# Patient Record
Sex: FEMALE | Race: WHITE | NOT HISPANIC OR LATINO | Employment: FULL TIME
[De-identification: names, ages, dates, MRNs, and addresses within clinical notes are randomized per-mention and may not be internally consistent; named-entity substitution may affect disease eponyms.]

---

## 2017-08-05 ENCOUNTER — HEALTH MAINTENANCE LETTER (OUTPATIENT)
Age: 52
End: 2017-08-05

## 2017-08-14 ENCOUNTER — HOSPITAL ENCOUNTER (OUTPATIENT)
Facility: CLINIC | Age: 52
Discharge: HOME | End: 2017-08-14
Attending: COLON & RECTAL SURGERY | Admitting: COLON & RECTAL SURGERY
Payer: COMMERCIAL

## 2017-08-14 VITALS
RESPIRATION RATE: 16 BRPM | DIASTOLIC BLOOD PRESSURE: 89 MMHG | WEIGHT: 175 LBS | SYSTOLIC BLOOD PRESSURE: 128 MMHG | HEIGHT: 66 IN | BODY MASS INDEX: 28.12 KG/M2 | OXYGEN SATURATION: 96 %

## 2017-08-14 LAB — COLONOSCOPY: NORMAL

## 2017-08-14 PROCEDURE — 88305 TISSUE EXAM BY PATHOLOGIST: CPT | Mod: 26 | Performed by: COLON & RECTAL SURGERY

## 2017-08-14 PROCEDURE — G0500 MOD SEDAT ENDO SERVICE >5YRS: HCPCS | Performed by: COLON & RECTAL SURGERY

## 2017-08-14 PROCEDURE — 88305 TISSUE EXAM BY PATHOLOGIST: CPT | Performed by: COLON & RECTAL SURGERY

## 2017-08-14 PROCEDURE — 99153 MOD SED SAME PHYS/QHP EA: CPT | Performed by: COLON & RECTAL SURGERY

## 2017-08-14 PROCEDURE — 45385 COLONOSCOPY W/LESION REMOVAL: CPT | Mod: PT | Performed by: COLON & RECTAL SURGERY

## 2017-08-14 PROCEDURE — 45382 COLONOSCOPY W/CONTROL BLEED: CPT | Performed by: COLON & RECTAL SURGERY

## 2017-08-14 PROCEDURE — 25000128 H RX IP 250 OP 636: Performed by: COLON & RECTAL SURGERY

## 2017-08-14 RX ORDER — ONDANSETRON 2 MG/ML
4 INJECTION INTRAMUSCULAR; INTRAVENOUS EVERY 6 HOURS PRN
Status: DISCONTINUED | OUTPATIENT
Start: 2017-08-14 | End: 2017-08-14 | Stop reason: HOSPADM

## 2017-08-14 RX ORDER — FENTANYL CITRATE 50 UG/ML
INJECTION, SOLUTION INTRAMUSCULAR; INTRAVENOUS PRN
Status: DISCONTINUED | OUTPATIENT
Start: 2017-08-14 | End: 2017-08-14 | Stop reason: HOSPADM

## 2017-08-14 RX ORDER — NALOXONE HYDROCHLORIDE 0.4 MG/ML
.1-.4 INJECTION, SOLUTION INTRAMUSCULAR; INTRAVENOUS; SUBCUTANEOUS
Status: DISCONTINUED | OUTPATIENT
Start: 2017-08-14 | End: 2017-08-14 | Stop reason: HOSPADM

## 2017-08-14 RX ORDER — FLUMAZENIL 0.1 MG/ML
0.2 INJECTION, SOLUTION INTRAVENOUS
Status: DISCONTINUED | OUTPATIENT
Start: 2017-08-14 | End: 2017-08-14 | Stop reason: HOSPADM

## 2017-08-14 RX ORDER — LIDOCAINE 40 MG/G
CREAM TOPICAL
Status: DISCONTINUED | OUTPATIENT
Start: 2017-08-14 | End: 2017-08-14 | Stop reason: HOSPADM

## 2017-08-14 RX ORDER — ONDANSETRON 4 MG/1
4 TABLET, ORALLY DISINTEGRATING ORAL EVERY 6 HOURS PRN
Status: DISCONTINUED | OUTPATIENT
Start: 2017-08-14 | End: 2017-08-14 | Stop reason: HOSPADM

## 2017-08-14 RX ORDER — ONDANSETRON 2 MG/ML
4 INJECTION INTRAMUSCULAR; INTRAVENOUS
Status: DISCONTINUED | OUTPATIENT
Start: 2017-08-14 | End: 2017-08-14 | Stop reason: HOSPADM

## 2017-08-14 NOTE — OP NOTE
See Provation Note In Chart    Taylor Palacios MD  [facility]  Office Phone # [phone]

## 2017-08-14 NOTE — IP AVS SNAPSHOT
MRN:[id number]                      After Visit Summary   8/14/2017    Kiera Manzo    MRN: [id number]           Thank you!     Thank you for choosing [facility] for your care. Our goal is always to provide you with excellent care. Hearing back from our patients is one way we can continue to improve our services. Please take a few minutes to complete the written survey that you may receive in the mail after you visit. If you would like to speak to someone directly about your visit please contact Patient Relations at [phone]. Thank you!          Patient Information     Date Of Birth          1965        About your hospital stay     You were admitted on:  August 14, 2017 You last received care in the:  [facility]    You were discharged on:  August 14, 2017       Who to Call     For medical emergencies, please call 911.  For non-urgent questions about your medical care, please call your primary care provider or clinic, None  For questions related to your surgery, please call your surgery clinic        Attending Provider     Provider Specialty    Taylor Palacios MD [facility]       Primary Care Provider    [facility]      Further instructions from your care team         Understanding Colon and Rectal Polyps     The colon has a smooth lining composed of millions of cells.     The colon (also called the large intestine) is a muscular tube that forms the last part of the digestive tract. It absorbs water and stores food waste. The colon is about 4 to 6 feet long. The rectum is the last 6 inches of the colon. The colon and rectum have a smooth lining composed of millions of cells. Changes in these cells can lead to growths in the colon that can become cancerous and should be removed.     When the Colon Lining Changes  Changes that occur in the cells that line the colon or rectum can lead to growths called polyps. Over a period of years, polyps  "can turn cancerous. Removing polyps early may prevent cancer from ever forming.      Polyps  Polyps are fleshy clumps of tissue that form on the lining of the colon or rectum. Small polyps are usually benign (not cancerous). However, over time, cells in a polyp can change and become cancerous. The larger a polyp grows, the more likely this is to happen. Also, certain types of polyps known as adenomatous polyps are considered premalignant. This means that they will almost always become cancerous if they re not removed.          Cancer  Almost all colorectal cancers start when polyp cells begin growing abnormally. As a cancerous tumor grows, it may involve more and more of the colon or rectum. In time, cancer can also grow beyond the colon or rectum and spread to nearby organs or to glands called lymph nodes. The cells can also travel to other parts of the body. This is known as metastasis. The earlier a cancerous tumor is removed, the better the chance of preventing its spread.        0323-2690 [place] [address]. All rights reserved. This information is not intended as a substitute for professional medical care. Always follow your healthcare professional's instructions.    Pending Results     No orders found from 8/12/2017 to 8/15/2017.            Admission Information     Date & Time Provider Department Dept. Phone    8/14/2017 Taylor Palacios MD [facility] [phone]      Your Vitals Were     Blood Pressure Respirations Height Weight Last Period Pulse Oximetry    117/88 16 1.676 m (5' 6\") 79.4 kg (175 lb) 04/11/2006 95%    BMI (Body Mass Index)                   28.25 kg/m2           [employer] Information     [employer] gives you secure access to your electronic health record. If you see a primary care provider, you can also send messages to your care team and make appointments. If you have questions, please call your primary care clinic.  If you do not have " a primary care provider, please call [phone] and they will assist you.        Care EveryWhere ID     This is your Care EveryWhere ID. This could be used by other organizations to access your [place] medical records  [id number]        Equal Access to Services     BASIM BROOKS : Hadii aad ku hadjavedjdoie Yossiali, waabdida luqadaha, qamaryta kalinhda neetacandiegabi, antonio perrinharshlibia cool. So [facility] [phone].    ATENCIÓN: Si habla español, tiene a adam disposición servicios gratuitos de asistencia lingüística. Llame al [phone].    We comply with applicable federal civil rights laws and [place] laws. We do not discriminate on the basis of race, color, national origin, age, disability sex, sexual orientation or gender identity.               Review of your medicines      CONTINUE these medicines which have NOT CHANGED        Dose / Directions    acetaminophen 500 MG tablet   Commonly known as:  TYLENOL   Used for:  Joint pain        Dose:  500-1000 mg   Take 1-2 tablets (500-1,000 mg) by mouth every 6 hours as needed for pain   Quantity:  30 tablet   Refills:  11       aspirin 81 MG tablet   Used for:  Joint pain        Dose:  81 mg   Take 1 tablet (81 mg) by mouth daily   Quantity:  30 tablet   Refills:  11       cetirizine 10 MG tablet   Commonly known as:  zyrTEC   Used for:  Seasonal allergies, Sinus congestion        Dose:  10 mg   Take 1 tablet (10 mg) by mouth every evening   Quantity:  30 tablet   Refills:  11       diphenhydrAMINE 25 MG tablet   Commonly known as:  BENADRYL   Used for:  Seasonal allergies        Dose:  25-50 mg   Take 1-2 tablets (25-50 mg) by mouth every 6 hours as needed for itching   Quantity:  60 tablet   Refills:  11       KLONOPIN PO        1 tab hs   Refills:  0       levothyroxine 150 MCG tablet   Commonly known as:  SYNTHROID/LEVOTHROID        Dose:  150 mcg   Take 150 mcg by mouth daily.   Refills:  0       naproxen sodium 550 MG tablet   Commonly known as:   ANAPROX   Used for:  Generalized osteoarthrosis, unspecified site        Dose:  550 mg   Take 1 tablet (550 mg) by mouth 2 times daily (with meals)   Quantity:  60 tablet   Refills:  1       pseudoePHEDrine 120 MG 12 hr tablet   Commonly known as:  SUDAFED   Used for:  Seasonal allergies, Sinus congestion        Dose:  120 mg   Take 1 tablet (120 mg) by mouth every 12 hours   Quantity:  60 tablet   Refills:  11       sertraline 100 MG tablet   Commonly known as:  ZOLOFT        Dose:  150 mg   Take 1.5 tablets (150 mg) by mouth daily   Quantity:  30 tablet   Refills:  1                Protect others around you: Learn how to safely use, store and throw away your medicines at www.disposemymeds.org.             Medication List: This is a list of all your medications and when to take them. Check marks below indicate your daily home schedule. Keep this list as a reference.      Medications           Morning Afternoon Evening Bedtime As Needed    acetaminophen 500 MG tablet   Commonly known as:  TYLENOL   Take 1-2 tablets (500-1,000 mg) by mouth every 6 hours as needed for pain                                aspirin 81 MG tablet   Take 1 tablet (81 mg) by mouth daily                                cetirizine 10 MG tablet   Commonly known as:  zyrTEC   Take 1 tablet (10 mg) by mouth every evening                                diphenhydrAMINE 25 MG tablet   Commonly known as:  BENADRYL   Take 1-2 tablets (25-50 mg) by mouth every 6 hours as needed for itching                                KLONOPIN PO   1 tab hs                                levothyroxine 150 MCG tablet   Commonly known as:  SYNTHROID/LEVOTHROID   Take 150 mcg by mouth daily.                                naproxen sodium 550 MG tablet   Commonly known as:  ANAPROX   Take 1 tablet (550 mg) by mouth 2 times daily (with meals)                                pseudoePHEDrine 120 MG 12 hr tablet   Commonly known as:  SUDAFED   Take 1 tablet (120 mg) by mouth  every 12 hours                                sertraline 100 MG tablet   Commonly known as:  ZOLOFT   Take 1.5 tablets (150 mg) by mouth daily

## 2017-08-14 NOTE — DISCHARGE INSTRUCTIONS
Understanding Colon and Rectal Polyps     The colon has a smooth lining composed of millions of cells.     The colon (also called the large intestine) is a muscular tube that forms the last part of the digestive tract. It absorbs water and stores food waste. The colon is about 4 to 6 feet long. The rectum is the last 6 inches of the colon. The colon and rectum have a smooth lining composed of millions of cells. Changes in these cells can lead to growths in the colon that can become cancerous and should be removed.     When the Colon Lining Changes  Changes that occur in the cells that line the colon or rectum can lead to growths called polyps. Over a period of years, polyps can turn cancerous. Removing polyps early may prevent cancer from ever forming.      Polyps  Polyps are fleshy clumps of tissue that form on the lining of the colon or rectum. Small polyps are usually benign (not cancerous). However, over time, cells in a polyp can change and become cancerous. The larger a polyp grows, the more likely this is to happen. Also, certain types of polyps known as adenomatous polyps are considered premalignant. This means that they will almost always become cancerous if they re not removed.          Cancer  Almost all colorectal cancers start when polyp cells begin growing abnormally. As a cancerous tumor grows, it may involve more and more of the colon or rectum. In time, cancer can also grow beyond the colon or rectum and spread to nearby organs or to glands called lymph nodes. The cells can also travel to other parts of the body. This is known as metastasis. The earlier a cancerous tumor is removed, the better the chance of preventing its spread.        6034-4435 [facility], [address] [place] [address], [place] [address]. All rights reserved. This information is not intended as a substitute for professional medical care. Always follow your healthcare professional's instructions.

## 2017-08-14 NOTE — H&P
Pre-Endoscopy History and Physical     Kiera Manzo MRN# [id number]   YOB: 1965 Age: 51 year old     Date of Procedure: 2017  Primary care provider: Sade Starks Md  Type of Endoscopy: colonoscopy  Reason for Procedure: screening  Type of Anesthesia Anticipated: Moderate Sedation    HPI:    Kiera is a 51 year old female who will be undergoing the above procedure.      A history and physical has been performed. The patient's medications and allergies have been reviewed. The risks and benefits of the procedure and the sedation options and risks were discussed with the patient.  All questions were answered and informed consent was obtained.      She denies a personal or family history of anesthesia complications or bleeding disorders.     Allergies   Allergen Reactions     Adhesive Tape Itching     Latex Other (See Comments)     Redness         Prior to Admission Medications   Prescriptions Last Dose Informant Patient Reported? Taking?   KLONOPIN OR 8/10/2017 at Unknown time  Yes Yes   Si tab hs   acetaminophen (TYLENOL) 500 MG tablet Unknown at Unknown time  No No   Sig: Take 1-2 tablets (500-1,000 mg) by mouth every 6 hours as needed for pain   aspirin 81 MG tablet Unknown at Unknown time  No No   Sig: Take 1 tablet (81 mg) by mouth daily   cetirizine (ZYRTEC) 10 MG tablet Unknown at Unknown time  No No   Sig: Take 1 tablet (10 mg) by mouth every evening   diphenhydrAMINE (BENADRYL) 25 MG tablet More than a month at Unknown time  No No   Sig: Take 1-2 tablets (25-50 mg) by mouth every 6 hours as needed for itching   levothyroxine (SYNTHROID, LEVOTHROID) 150 MCG tablet 2017 at Unknown time  Yes Yes   Sig: Take 150 mcg by mouth daily.   naproxen sodium (ANAPROX) 550 MG tablet 2017 at Unknown time  No Yes   Sig: Take 1 tablet (550 mg) by mouth 2 times daily (with meals)   pseudoePHEDrine (SUDAFED) 120 MG 12 hr tablet Past Week at Unknown time  No Yes   Sig: Take 1 tablet (120 mg) by  "mouth every 12 hours   sertraline (ZOLOFT) 100 MG tablet 8/13/2017 at Unknown time  Yes Yes   Sig: Take 1.5 tablets (150 mg) by mouth daily      Facility-Administered Medications: None       Patient Active Problem List   Diagnosis     Hypothyroidism     Generalized osteoarthrosis, unspecified site     Arthralgia of temporomandibular joint     Anxiety state     Hyperlipidemia LDL goal <160     Family hx-breast malignancy     Elevated glucose     Hypertriglyceridemia        Past Medical History:   Diagnosis Date     Absence of menstruation     using nuvaring     Arthralgia of temporomandibular joint      Depressive disorder, not elsewhere classified     with anxiety, tx with zoloft in past     NONSPECIFIC MEDICAL HISTORY 1999    vag del x1     NONSPECIFIC MEDICAL HISTORY 2004    fertility tx, unsuccessful     Unspecified hypothyroidism     managed by Dr Smith        History reviewed. No pertinent surgical history.    Social History   Substance Use Topics     Smoking status: Never Smoker     Smokeless tobacco: Never Used     Alcohol use Yes      Comment: occasionally-wine once a week       Family History   Problem Relation Age of Onset     Hypertension Mother      Breast Cancer Mother      early stage, remission     HEART DISEASE Mother      Hypertension Brother      Hypertension Brother        REVIEW OF SYSTEMS:     5 point ROS negative except as noted above in HPI, including Gen., Resp., CV, GI &  system review.      PHYSICAL EXAM:   LMP 04/11/2006 Estimated body mass index is 27.37 kg/(m^2) as calculated from the following:    Height as of 12/9/14: 1.664 m (5' 5.5\").    Weight as of 12/9/14: 75.8 kg (167 lb).   GENERAL APPEARANCE: healthy and alert  MENTAL STATUS: alert  AIRWAY EXAM: Mallampatti Class II (visualization of the soft palate, fauces, and uvula)  RESP: lungs clear to auscultation - no rales, rhonchi or wheezes  CV: regular rates and rhythm      DIAGNOSTICS:    Not indicated      IMPRESSION   ASA Class " 2 - Mild systemic disease        PLAN:       Plan for colonoscopy. We discussed the risks, benefits and alternatives and the patient wished to proceed.    The above has been forwarded to the consulting provider.      Signed Electronically by: Taylor Palacios MD  August 14, 2017

## 2017-08-15 LAB — COPATH REPORT: NORMAL

## 2018-09-23 ENCOUNTER — APPOINTMENT (OUTPATIENT)
Dept: GENERAL RADIOLOGY | Facility: CLINIC | Age: 53
End: 2018-09-23
Attending: EMERGENCY MEDICINE
Payer: COMMERCIAL

## 2018-09-23 ENCOUNTER — HOSPITAL ENCOUNTER (EMERGENCY)
Facility: CLINIC | Age: 53
Discharge: HOME | End: 2018-09-23
Attending: EMERGENCY MEDICINE | Admitting: EMERGENCY MEDICINE
Payer: COMMERCIAL

## 2018-09-23 VITALS
HEIGHT: 67 IN | DIASTOLIC BLOOD PRESSURE: 90 MMHG | OXYGEN SATURATION: 95 % | SYSTOLIC BLOOD PRESSURE: 115 MMHG | WEIGHT: 165 LBS | HEART RATE: 91 BPM | TEMPERATURE: 99.5 F | RESPIRATION RATE: 24 BRPM | BODY MASS INDEX: 25.9 KG/M2

## 2018-09-23 DIAGNOSIS — M94.0 COSTOCHONDRITIS: ICD-10-CM

## 2018-09-23 LAB
ANION GAP SERPL CALCULATED.3IONS-SCNC: 8 MMOL/L (ref 3–14)
BASOPHILS # BLD AUTO: 0 10E9/L (ref 0–0.2)
BASOPHILS NFR BLD AUTO: 0.4 %
BUN SERPL-MCNC: 10 MG/DL (ref 7–30)
CALCIUM SERPL-MCNC: 8.6 MG/DL (ref 8.5–10.1)
CHLORIDE SERPL-SCNC: 106 MMOL/L (ref 94–109)
CO2 SERPL-SCNC: 25 MMOL/L (ref 20–32)
CREAT SERPL-MCNC: 0.72 MG/DL (ref 0.52–1.04)
D DIMER PPP FEU-MCNC: 0.4 UG/ML FEU (ref 0–0.5)
DIFFERENTIAL METHOD BLD: NORMAL
EOSINOPHIL # BLD AUTO: 0.1 10E9/L (ref 0–0.7)
EOSINOPHIL NFR BLD AUTO: 0.6 %
ERYTHROCYTE [DISTWIDTH] IN BLOOD BY AUTOMATED COUNT: 11.8 % (ref 10–15)
GFR SERPL CREATININE-BSD FRML MDRD: 85 ML/MIN/1.7M2
GLUCOSE SERPL-MCNC: 115 MG/DL (ref 70–99)
HCT VFR BLD AUTO: 44.3 % (ref 35–47)
HGB BLD-MCNC: 14.7 G/DL (ref 11.7–15.7)
IMM GRANULOCYTES # BLD: 0 10E9/L (ref 0–0.4)
IMM GRANULOCYTES NFR BLD: 0.2 %
LYMPHOCYTES # BLD AUTO: 1.3 10E9/L (ref 0.8–5.3)
LYMPHOCYTES NFR BLD AUTO: 12.7 %
MCH RBC QN AUTO: 29.6 PG (ref 26.5–33)
MCHC RBC AUTO-ENTMCNC: 33.2 G/DL (ref 31.5–36.5)
MCV RBC AUTO: 89 FL (ref 78–100)
MONOCYTES # BLD AUTO: 0.8 10E9/L (ref 0–1.3)
MONOCYTES NFR BLD AUTO: 7.4 %
NEUTROPHILS # BLD AUTO: 8 10E9/L (ref 1.6–8.3)
NEUTROPHILS NFR BLD AUTO: 78.7 %
NRBC # BLD AUTO: 0 10*3/UL
NRBC BLD AUTO-RTO: 0 /100
PLATELET # BLD AUTO: 286 10E9/L (ref 150–450)
POTASSIUM SERPL-SCNC: 3.8 MMOL/L (ref 3.4–5.3)
RBC # BLD AUTO: 4.96 10E12/L (ref 3.8–5.2)
SODIUM SERPL-SCNC: 139 MMOL/L (ref 133–144)
TROPONIN I SERPL-MCNC: <0.015 UG/L (ref 0–0.04)
WBC # BLD AUTO: 10.2 10E9/L (ref 4–11)

## 2018-09-23 PROCEDURE — 71046 X-RAY EXAM CHEST 2 VIEWS: CPT

## 2018-09-23 PROCEDURE — 93005 ELECTROCARDIOGRAM TRACING: CPT

## 2018-09-23 PROCEDURE — 96374 THER/PROPH/DIAG INJ IV PUSH: CPT

## 2018-09-23 PROCEDURE — 99285 EMERGENCY DEPT VISIT HI MDM: CPT | Mod: 25

## 2018-09-23 PROCEDURE — 80048 BASIC METABOLIC PNL TOTAL CA: CPT | Performed by: EMERGENCY MEDICINE

## 2018-09-23 PROCEDURE — 25000128 H RX IP 250 OP 636: Performed by: EMERGENCY MEDICINE

## 2018-09-23 PROCEDURE — 84484 ASSAY OF TROPONIN QUANT: CPT | Performed by: EMERGENCY MEDICINE

## 2018-09-23 PROCEDURE — 85025 COMPLETE CBC W/AUTO DIFF WBC: CPT | Performed by: EMERGENCY MEDICINE

## 2018-09-23 PROCEDURE — 85379 FIBRIN DEGRADATION QUANT: CPT | Performed by: EMERGENCY MEDICINE

## 2018-09-23 RX ORDER — KETOROLAC TROMETHAMINE 15 MG/ML
15 INJECTION, SOLUTION INTRAMUSCULAR; INTRAVENOUS ONCE
Status: COMPLETED | OUTPATIENT
Start: 2018-09-23 | End: 2018-09-23

## 2018-09-23 RX ORDER — NAPROXEN 500 MG/1
500 TABLET ORAL 2 TIMES DAILY WITH MEALS
Qty: 28 TABLET | Refills: 0 | Status: SHIPPED | OUTPATIENT
Start: 2018-09-23 | End: 2018-10-07

## 2018-09-23 RX ADMIN — KETOROLAC TROMETHAMINE 15 MG: 15 INJECTION, SOLUTION INTRAMUSCULAR; INTRAVENOUS at 16:07

## 2018-09-23 ASSESSMENT — ENCOUNTER SYMPTOMS
SHORTNESS OF BREATH: 1
CHEST TIGHTNESS: 1
NAUSEA: 0

## 2018-09-23 NOTE — ED AVS SNAPSHOT
[facility]    [address]    [facility] [address]    Phone:  [phone]    Fax:  [phone]                                       Kiera Manzo   MRN: [id number]    Department:  [facility]   Date of Visit:  9/23/2018           Patient Information     Date Of Birth          1965        Your diagnoses for this visit were:     Costochondritis        You were seen by Sameer Bar MD.      Follow-up Information     Follow up with primary care provider.    Why:  Wed-Fri of this week, sooner if pain is worsening         Follow up with [facility].    Specialty:  EMERGENCY MEDICINE    Why:  As needed, If symptoms worsen    Contact information:    [address]  [facility] [id number] [phone]        Discharge Instructions         Chest Wall Pain: Costochondritis    The chest pain that you have had today is caused by costochondritis. This condition is caused by an inflammation of the cartilage joining your ribs to your breastbone. It is not caused by heart or lung problems. Your healthcare team has made sure that the chest pain you feel is not from a life threatening cause of chest pain such as heart attack, collapsed lung, blood clot in the lung, tear in the aorta, or esophageal rupture. The inflammation may have been brought on by a blow to the chest, lifting heavy objects, intense exercise, or an illness that made you cough and sneeze a lot. It often occurs during times of emotional stress. It can be painful, but it is not dangerous. It usually goes away in 1 to 2 weeks. But it may happen again. Rarely, a more serious condition may cause symptoms similar to costochondritis. That s why it s important to watch for the warning signs listed below.  Home care  Follow these guidelines when caring for yourself at home:    If you feel that emotional stress is a cause of your  condition, try to figure out the sources of that stress. It may not be obvious. Learn ways to deal with the stress in your life. This can include regular exercise, muscle relaxation, meditation, or simply taking time out for yourself.    You may use acetaminophen, ibuprofen, or naproxen to control pain, unless another pain medicine was prescribed. If you have liver or kidney disease or ever had a stomach ulcer, talk with your healthcare provider before using these medicines.    You can also help ease pain by using a hot, wet compress or heating pad. Use this with or without a medicated skin cream that helps relieves pain.    Do stretching exercise as advised by your provider.    Take any prescribed medicines as directed.  Follow-up care  Follow up with your healthcare provider, or as advised, if you do not start to get better in the next 2 days.  When to seek medical advice  Call your healthcare provider right away if any of these occur:    A change in the type of pain. Call if it feels different, becomes more serious, lasts longer, or spreads into your shoulder, arm, neck, jaw, or back.    Shortness of breath or pain gets worse when you breathe    Weakness, dizziness, or fainting    Cough with dark-colored sputum (phlegm) or blood    Abdominal pain    Dark red or black stools    Fever of 100.4 F (38 C) or higher, or as directed by your healthcare provider  Date Last Reviewed: 12/1/2016 2000-2017 [employer]. [address] [place] [address]. All rights reserved. This information is not intended as a substitute for professional medical care. Always follow your healthcare professional's instructions.          24 Hour Appointment Hotline       To make an appointment at any [facility], call [phone] ([phone]). If you don't have a family doctor or clinic, we will help you find one. [place] clinics are conveniently located to serve the needs of you and your family.              Review of your medicines      START taking        Dose / Directions Last dose taken    naproxen 500 MG tablet   Commonly known as:  NAPROSYN   Dose:  500 mg   Quantity:  28 tablet        Take 1 tablet (500 mg) by mouth 2 times daily (with meals) for 14 days May discontinue when pain improves.   Refills:  0          Our records show that you are taking the medicines listed below. If these are incorrect, please call your family doctor or clinic.        Dose / Directions Last dose taken    acetaminophen 500 MG tablet   Commonly known as:  TYLENOL   Dose:  500-1000 mg   Quantity:  30 tablet        Take 1-2 tablets (500-1,000 mg) by mouth every 6 hours as needed for pain   Refills:  11        aspirin 81 MG tablet   Dose:  81 mg   Quantity:  30 tablet        Take 1 tablet (81 mg) by mouth daily   Refills:  11        cetirizine 10 MG tablet   Commonly known as:  zyrTEC   Dose:  10 mg   Quantity:  30 tablet        Take 1 tablet (10 mg) by mouth every evening   Refills:  11        diphenhydrAMINE 25 MG tablet   Commonly known as:  BENADRYL   Dose:  25-50 mg   Quantity:  60 tablet        Take 1-2 tablets (25-50 mg) by mouth every 6 hours as needed for itching   Refills:  11        KLONOPIN PO        1 tab hs   Refills:  0        levothyroxine 150 MCG tablet   Commonly known as:  SYNTHROID/LEVOTHROID   Dose:  150 mcg        Take 150 mcg by mouth daily.   Refills:  0        pseudoePHEDrine 120 MG 12 hr tablet   Commonly known as:  SUDAFED   Dose:  120 mg   Quantity:  60 tablet        Take 1 tablet (120 mg) by mouth every 12 hours   Refills:  11        sertraline 100 MG tablet   Commonly known as:  ZOLOFT   Dose:  150 mg   Quantity:  30 tablet        Take 1.5 tablets (150 mg) by mouth daily   Refills:  1          STOP taking        Dose Reason for stopping Comments    naproxen sodium 550 MG tablet   Commonly known as:  ANAPROX                      Prescriptions were sent or printed at these locations (1 Prescription)                    Other Prescriptions                Printed at Department/Unit printer (1 of 1)         naproxen (NAPROSYN) 500 MG tablet                Procedures and tests performed during your visit     Basic metabolic panel (BMP)    CBC + differential    Chest XR,  PA & LAT    D dimer quantitative    EKG 12 lead    Troponin I (now)      Orders Needing Specimen Collection     None      Pending Results     Date and Time Order Name Status Description    9/23/2018 1510 EKG 12 lead Preliminary             Pending Culture Results     No orders found from 9/21/2018 to 9/24/2018.            Pending Results Instructions     If you had any lab results that were not finalized at the time of your Discharge, you can call the ED Lab Result RN at [phone]. You will be contacted by this team for any positive Lab results or changes in treatment. The nurses are available 7 days a week from 10A to 6:30P.  You can leave a message 24 hours per day and they will return your call.        Test Results From Your Hospital Stay        9/23/2018  3:33 PM      Component Results     Component Value Ref Range & Units Status    WBC 10.2 4.0 - 11.0 10e9/L Final    RBC Count 4.96 3.8 - 5.2 10e12/L Final    Hemoglobin 14.7 11.7 - 15.7 g/dL Final    Hematocrit 44.3 35.0 - 47.0 % Final    MCV 89 78 - 100 fl Final    MCH 29.6 26.5 - 33.0 pg Final    MCHC 33.2 31.5 - 36.5 g/dL Final    RDW 11.8 10.0 - 15.0 % Final    Platelet Count 286 150 - 450 10e9/L Final    Diff Method Automated Method  Final    % Neutrophils 78.7 % Final    % Lymphocytes 12.7 % Final    % Monocytes 7.4 % Final    % Eosinophils 0.6 % Final    % Basophils 0.4 % Final    % Immature Granulocytes 0.2 % Final    Nucleated RBCs 0 0 /100 Final    Absolute Neutrophil 8.0 1.6 - 8.3 10e9/L Final    Absolute Lymphocytes 1.3 0.8 - 5.3 10e9/L Final    Absolute Monocytes 0.8 0.0 - 1.3 10e9/L Final    Absolute Eosinophils 0.1 0.0 - 0.7 10e9/L Final    Absolute Basophils 0.0 0.0 - 0.2 10e9/L Final     Abs Immature Granulocytes 0.0 0 - 0.4 10e9/L Final    Absolute Nucleated RBC 0.0  Final         9/23/2018  3:51 PM      Component Results     Component Value Ref Range & Units Status    Sodium 139 133 - 144 mmol/L Final    Potassium 3.8 3.4 - 5.3 mmol/L Final    Chloride 106 94 - 109 mmol/L Final    Carbon Dioxide 25 20 - 32 mmol/L Final    Anion Gap 8 3 - 14 mmol/L Final    Glucose 115 (H) 70 - 99 mg/dL Final    Urea Nitrogen 10 7 - 30 mg/dL Final    Creatinine 0.72 0.52 - 1.04 mg/dL Final    GFR Estimate 85 >60 mL/min/1.7m2 Final    Non  GFR Calc    GFR Estimate If Black >90 >60 mL/min/1.7m2 Final    African American GFR Calc    Calcium 8.6 8.5 - 10.1 mg/dL Final         9/23/2018  3:51 PM      Component Results     Component Value Ref Range & Units Status    Troponin I ES <0.015 0.000 - 0.045 ug/L Final    The 99th percentile for upper reference range is 0.045 ug/L.  Troponin values   in the range of 0.045 - 0.120 ug/L may be associated with risks of adverse   clinical events.           9/23/2018  4:14 PM      Component Results     Component Value Ref Range & Units Status    D Dimer 0.4 0.0 - 0.50 ug/ml FEU Final    This D-dimer assay is intended for use in conjunction with a clinical pretest   probability assessment model to exclude pulmonary embolism (PE) and deep   venous thrombosis (DVT) in outpatients suspected of PE or DVT. The cut-off   value is 0.5 ug/mL FEU.           9/23/2018  4:57 PM      Narrative     CHEST TWO VIEW   9/23/2018 4:42 PM     HISTORY: Pleuritic chest pain.     COMPARISON: None.     FINDINGS: Cardiomediastinal silhouette within normal limits. No focal  airspace opacities. Probable calcified granuloma in the left lower  lobe. No pleural effusion. No pneumothorax identified. The bones are  unremarkable.         Impression     IMPRESSION: No acute cardiopulmonary abnormalities.    IRINA SARAH MD                Clinical Quality Measure: Blood Pressure Screening     Your  blood pressure was checked while you were in the emergency department today. The last reading we obtained was  BP: (!) 136/95 . Please read the guidelines below about what these numbers mean and what you should do about them.  If your systolic blood pressure (the top number) is less than 120 and your diastolic blood pressure (the bottom number) is less than 80, then your blood pressure is normal. There is nothing more that you need to do about it.  If your systolic blood pressure (the top number) is 120-139 or your diastolic blood pressure (the bottom number) is 80-89, your blood pressure may be higher than it should be. You should have your blood pressure rechecked within a year by a primary care provider.  If your systolic blood pressure (the top number) is 140 or greater or your diastolic blood pressure (the bottom number) is 90 or greater, you may have high blood pressure. High blood pressure is treatable, but if left untreated over time it can put you at risk for heart attack, stroke, or kidney failure. You should have your blood pressure rechecked by a primary care provider within the next 4 weeks.  If your provider in the emergency department today gave you specific instructions to follow-up with your doctor or provider even sooner than that, you should follow that instruction and not wait for up to 4 weeks for your follow-up visit.        Thank you for choosing [facility]       Thank you for choosing [facility] for your care. Our goal is always to provide you with excellent care. Hearing back from our patients is one way we can continue to improve our services. Please take a few minutes to complete the written survey that you may receive in the mail after you visit with us. Thank you!        [facility] Information     [employer] gives you secure access to your electronic health record. If you see a primary care provider, you can also send messages to your care team and make appointments. If you have questions, please  call your primary care clinic.  If you do not have a primary care provider, please call [phone] and they will assist you.        Care EveryWhere ID     This is your Care EveryWhere ID. This could be used by other organizations to access your [facility] medical records  [id number]        Equal Access to Services     BASIM BROOKS : Harsh Turner, yuliana garcia, kvng santiagoalantonio reyna. So [facility] [phone].    ATENCIÓN: Si habla español, tiene a adam disposición servicios gratuitos de asistencia lingüística. Llame al [phone].    We comply with applicable federal civil rights laws and [place] laws. We do not discriminate on the basis of race, color, national origin, age, disability, sex, sexual orientation, or gender identity.            After Visit Summary       This is your record. Keep this with you and show to your community pharmacist(s) and doctor(s) at your next visit.

## 2018-09-23 NOTE — ED TRIAGE NOTES
Patient complaining of chest pressure since waking this morning.  Also having pain with deep inspiration.  Had a flu shot two days ago.      ABCs intact.  Alert and oriented x 3.

## 2018-09-23 NOTE — ED AVS SNAPSHOT
[facility]    [address]    [facility] [address]    Phone:  [phone]    Fax:  [phone]                                       Kiera Manzo   MRN: [id number]    Department:  [facility]   Date of Visit:  9/23/2018           After Visit Summary Signature Page     I have received my discharge instructions, and my questions have been answered. I have discussed any challenges I see with this plan with the nurse or doctor.    ..........................................................................................................................................  Patient/Patient Representative Signature      ..........................................................................................................................................  Patient Representative Print Name and Relationship to Patient    ..................................................               ................................................  Date                                   Time    ..........................................................................................................................................  Reviewed by Signature/Title    ...................................................              ..............................................  Date                                               Time          22EPIC Rev 08/18

## 2018-09-23 NOTE — ED PROVIDER NOTES
"  History     Chief Complaint:  Chest Pain and Shortness of Breath    The history is provided by the patient.      Kiera Manzo is a 53 year old female with a history of hypothyroidism, hyperlipidemia, HTN, TMJ, and anxiety  who presents to the emergency department with her  Evin for evaluation of chest pain and shortness of breath. This morning at 0600, the patient woke up with chest pain under her sternum down to her breasts. She feels chest pressure, described as a squeezing pain, as well as feeling like she wants to cough but is unable to and pain with inspiration. She states her pain feels like she has been \"kicked\" in the chest. This chest pain and pain with breathing prompted the patient to seek evaluation here in the emergency department.     Here, the patient presents with anxiety but has since calmed down. She denies the chest pain radiating into her arm, neck, and jaw. She denies nausea. She reports her arm does hurt secondary to just recently receiving the flu shot. Her arm feels like sore muscles. She does report a history of GERD but denies the feeling of the \"burning liquid sensation\" that her history of GERD has given her. She reports having spaghetti without spicy sausage for dinner last night and then raisin bran and peanuts before bed.   She denies history of breathing issues. She had 2 Naproxen in the \"early afternoon\". She also took a benadryl     Allergies:  Adhesive tape  Latex     Medications:    Aspirin  Zyrtec  Klonopin  Benadryl  Zoloft  Sudafed  Anaprox  Levothyroxine  Zoloft    Past Medical History:    Absence of menstruation  TMJ  Depression  Hypertriglyceridemia  Hyperlipidemia  Anxiety  Hypothyroidism   Generalized osteoarthrosis  HTN    Past Surgical History:    The patient does not have any pertinent past surgical history.    Family History:    HTN  Breast cancer  Heart disease    Social History:  Negative for tobacco use.  Positive for alcohol use  Marital Status:    " "    Review of Systems   Respiratory: Positive for chest tightness and shortness of breath.    Cardiovascular: Positive for chest pain.   Gastrointestinal: Negative for nausea.   All other systems reviewed and are negative.    Physical Exam     Patient Vitals for the past 24 hrs:   BP Temp Temp src Pulse Heart Rate Resp SpO2 Height Weight   09/23/18 1645 (!) 136/95 - - - 75 - 97 % - -   09/23/18 1630 (!) 130/91 - - - 82 - 94 % - -   09/23/18 1615 (!) 130/96 - - - 81 - 92 % - -   09/23/18 1600 (!) 141/92 - - - - - - - -   09/23/18 1545 (!) 144/98 - - - 83 - 94 % - -   09/23/18 1530 (!) 136/96 - - - 87 - 92 % - -   09/23/18 1505 (!) 151/116 99.5  F (37.5  C) Oral 91 - 24 94 % 1.702 m (5' 7\") 74.8 kg (165 lb)       Physical Exam  Nursing note and vitals reviewed.  Constitutional: Cooperative.   HENT:   Mouth/Throat: Moist mucous membranes.   Eyes: EOMI, nonicteric sclera  Cardiovascular: Normal rate, regular rhythm, no murmurs, rubs, or gallops  Pulmonary/Chest: Effort normal and breath sounds normal. No respiratory distress. No wheezes. No rales. Pain to palpation anterior chest.   Abdominal: Soft. Nontender, nondistended, no guarding or rigidity. BS present.   Musculoskeletal: Normal range of motion.   Neurological: Alert. Moves all extremities spontaneously.   Skin: Skin is warm and dry. No rash noted.   Psychiatric: Normal mood and affect.       Emergency Department Course   ECG:  Indication: chest pain, shortness of breath   Time: 1503  Vent. Rate 92 bpm. AZ interval 174. QRS duration 72. QT/QTc 362/447. P-R-T axis 50 19 56. Normal sinus rhythm. Normal ECG. Read time: 1506.     Imaging:  Radiographic findings were communicated with the patient who voiced understanding of the findings.    XR Chest PA & LAT:   No acute cardiopulmonary abnormalities. As per radiology.     Laboratory:  CBC: WBC: 10.2, HGB: 14.7, PLT: 286  1522 Troponin: <0.015  BMP: Glucose 115 (H), o/w WNL (Creatinine: 0.72)  D-Dimer: " 0.4    Interventions:  1607 Toradol 15 mg IV    Emergency Department Course:  1534 Nursing notes and vitals reviewed.  I performed an exam of the patient as documented above.     IV inserted. Medicine administered as documented above. Blood drawn. This was sent to the lab for further testing, results above.    EKG obtained in the ED, see results above.     The patient was sent for a chest xray while in the emergency department, findings above.     1727 I rechecked the patient and discussed the results of her workup thus far.     Findings and plan explained to the Patient. Patient discharged home with instructions regarding supportive care, medications, and reasons to return. The importance of close follow-up was reviewed. The patient was prescribed Naproxen.     I personally reviewed the laboratory results with the Patient and spouse and answered all related questions prior to discharge.   Impression & Plan    Medical Decision Making:  Kiera Manzo is a 53 year old female presents with complaint of chest wall/rib pain.  There has been no trauma.  Pain is exactly reproducible with palpation.  CXR was obtained and showed no acute process. This does not appear to be ACS or PE as symptomatology is different and d-dimer/troponin are negative. She has had some improvement with treatment here.  She is advised to take Naproxen and use ice or heat to relieve pain.  She will follow up with PCP in 3-5 days if no improvement or sooner if worsening.  she will return to the ED if she develops difficulty breathing, fever, worsening chest pain or for other concerns.    Diagnosis:    ICD-10-CM    1. Costochondritis M94.0        Disposition:  discharged to home with her     Discharge Medications:  New Prescriptions    NAPROXEN (NAPROSYN) 500 MG TABLET    Take 1 tablet (500 mg) by mouth 2 times daily (with meals) for 14 days May discontinue when pain improves.     Scribe Disclosure:   I, Soco Olivera, am serving as a scribe on  9/23/2018 at 3:34 PM to personally document services performed by Sameer Bar MD based on my observations and the provider's statements to me.     Soco Olivera  9/23/2018   [facility] EMERGENCY DEPARTMENT       Sameer Bar MD  09/25/18 0703

## 2018-09-23 NOTE — DISCHARGE INSTRUCTIONS
Chest Wall Pain: Costochondritis    The chest pain that you have had today is caused by costochondritis. This condition is caused by an inflammation of the cartilage joining your ribs to your breastbone. It is not caused by heart or lung problems. Your healthcare team has made sure that the chest pain you feel is not from a life threatening cause of chest pain such as heart attack, collapsed lung, blood clot in the lung, tear in the aorta, or esophageal rupture. The inflammation may have been brought on by a blow to the chest, lifting heavy objects, intense exercise, or an illness that made you cough and sneeze a lot. It often occurs during times of emotional stress. It can be painful, but it is not dangerous. It usually goes away in 1 to 2 weeks. But it may happen again. Rarely, a more serious condition may cause symptoms similar to costochondritis. That s why it s important to watch for the warning signs listed below.  Home care  Follow these guidelines when caring for yourself at home:    If you feel that emotional stress is a cause of your condition, try to figure out the sources of that stress. It may not be obvious. Learn ways to deal with the stress in your life. This can include regular exercise, muscle relaxation, meditation, or simply taking time out for yourself.    You may use acetaminophen, ibuprofen, or naproxen to control pain, unless another pain medicine was prescribed. If you have liver or kidney disease or ever had a stomach ulcer, talk with your healthcare provider before using these medicines.    You can also help ease pain by using a hot, wet compress or heating pad. Use this with or without a medicated skin cream that helps relieves pain.    Do stretching exercise as advised by your provider.    Take any prescribed medicines as directed.  Follow-up care  Follow up with your healthcare provider, or as advised, if you do not start to get better in the next 2 days.  When to seek medical  advice  Call your healthcare provider right away if any of these occur:    A change in the type of pain. Call if it feels different, becomes more serious, lasts longer, or spreads into your shoulder, arm, neck, jaw, or back.    Shortness of breath or pain gets worse when you breathe    Weakness, dizziness, or fainting    Cough with dark-colored sputum (phlegm) or blood    Abdominal pain    Dark red or black stools    Fever of 100.4 F (38 C) or higher, or as directed by your healthcare provider  Date Last Reviewed: 12/1/2016 2000-2017 [employer]. [address] [place] [address]. All rights reserved. This information is not intended as a substitute for professional medical care. Always follow your healthcare professional's instructions.

## 2018-09-24 LAB — INTERPRETATION ECG - MUSE: NORMAL

## 2019-10-03 ENCOUNTER — HEALTH MAINTENANCE LETTER (OUTPATIENT)
Age: 54
End: 2019-10-03

## 2020-11-07 ENCOUNTER — HEALTH MAINTENANCE LETTER (OUTPATIENT)
Age: 55
End: 2020-11-07

## 2021-09-05 ENCOUNTER — HEALTH MAINTENANCE LETTER (OUTPATIENT)
Age: 56
End: 2021-09-05

## 2021-12-26 ENCOUNTER — HEALTH MAINTENANCE LETTER (OUTPATIENT)
Age: 56
End: 2021-12-26

## 2023-08-03 ENCOUNTER — TRANSCRIBE ORDERS (OUTPATIENT)
Dept: GASTROENTEROLOGY | Facility: CLINIC | Age: 58
End: 2023-08-03
Payer: COMMERCIAL

## 2023-08-03 ENCOUNTER — TELEPHONE (OUTPATIENT)
Dept: GASTROENTEROLOGY | Facility: CLINIC | Age: 58
End: 2023-08-03
Payer: COMMERCIAL

## 2023-08-03 DIAGNOSIS — Z86.0100 PERSONAL HISTORY OF COLONIC POLYPS: Primary | ICD-10-CM

## 2023-08-03 NOTE — TELEPHONE ENCOUNTER
Procedure Scheduled: Lower Endoscopy [Colonoscopy]  Procedure Date: 8/29/2023  Site:[facility]; [address] [place] [address]  Endoscopist: ZEESHAN  Ordering Provider: ZEESHAN  Scheduled by (per the direction of): FAX FROM VALERIE ROBB           REMINDER: We do not accept [employer] insurance.

## 2023-08-24 RX ORDER — HYDROXYZINE HYDROCHLORIDE 25 MG/1
25 TABLET, FILM COATED ORAL AT BEDTIME
COMMUNITY

## 2023-08-29 ENCOUNTER — HOSPITAL ENCOUNTER (OUTPATIENT)
Facility: CLINIC | Age: 58
Discharge: HOME | End: 2023-08-29
Attending: COLON & RECTAL SURGERY | Admitting: COLON & RECTAL SURGERY
Payer: COMMERCIAL

## 2023-08-29 VITALS
RESPIRATION RATE: 16 BRPM | SYSTOLIC BLOOD PRESSURE: 125 MMHG | HEIGHT: 66 IN | OXYGEN SATURATION: 96 % | BODY MASS INDEX: 26.52 KG/M2 | WEIGHT: 165 LBS | DIASTOLIC BLOOD PRESSURE: 92 MMHG | HEART RATE: 68 BPM

## 2023-08-29 LAB — COLONOSCOPY: NORMAL

## 2023-08-29 PROCEDURE — G0121 COLON CA SCRN NOT HI RSK IND: HCPCS | Performed by: COLON & RECTAL SURGERY

## 2023-08-29 PROCEDURE — 99153 MOD SED SAME PHYS/QHP EA: CPT | Performed by: COLON & RECTAL SURGERY

## 2023-08-29 PROCEDURE — 250N000013 HC RX MED GY IP 250 OP 250 PS 637: Performed by: COLON & RECTAL SURGERY

## 2023-08-29 PROCEDURE — G0500 MOD SEDAT ENDO SERVICE >5YRS: HCPCS | Performed by: COLON & RECTAL SURGERY

## 2023-08-29 PROCEDURE — 45378 DIAGNOSTIC COLONOSCOPY: CPT | Performed by: COLON & RECTAL SURGERY

## 2023-08-29 PROCEDURE — 250N000011 HC RX IP 250 OP 636: Performed by: COLON & RECTAL SURGERY

## 2023-08-29 RX ORDER — FENTANYL CITRATE 50 UG/ML
50-100 INJECTION, SOLUTION INTRAMUSCULAR; INTRAVENOUS EVERY 5 MIN PRN
Status: DISCONTINUED | OUTPATIENT
Start: 2023-08-29 | End: 2023-08-29 | Stop reason: HOSPADM

## 2023-08-29 RX ORDER — DIPHENHYDRAMINE HYDROCHLORIDE 50 MG/ML
25-50 INJECTION INTRAMUSCULAR; INTRAVENOUS
Status: DISCONTINUED | OUTPATIENT
Start: 2023-08-29 | End: 2023-08-29 | Stop reason: HOSPADM

## 2023-08-29 RX ORDER — EPINEPHRINE 1 MG/ML
0.1 INJECTION, SOLUTION INTRAMUSCULAR; SUBCUTANEOUS
Status: DISCONTINUED | OUTPATIENT
Start: 2023-08-29 | End: 2023-08-29 | Stop reason: HOSPADM

## 2023-08-29 RX ORDER — LIDOCAINE 40 MG/G
CREAM TOPICAL
Status: DISCONTINUED | OUTPATIENT
Start: 2023-08-29 | End: 2023-08-29 | Stop reason: HOSPADM

## 2023-08-29 RX ORDER — FLUMAZENIL 0.1 MG/ML
0.2 INJECTION, SOLUTION INTRAVENOUS
Status: DISCONTINUED | OUTPATIENT
Start: 2023-08-29 | End: 2023-08-29 | Stop reason: HOSPADM

## 2023-08-29 RX ORDER — NALOXONE HYDROCHLORIDE 0.4 MG/ML
0.2 INJECTION, SOLUTION INTRAMUSCULAR; INTRAVENOUS; SUBCUTANEOUS
Status: DISCONTINUED | OUTPATIENT
Start: 2023-08-29 | End: 2023-08-29 | Stop reason: HOSPADM

## 2023-08-29 RX ORDER — ATROPINE SULFATE 0.1 MG/ML
1 INJECTION INTRAVENOUS
Status: DISCONTINUED | OUTPATIENT
Start: 2023-08-29 | End: 2023-08-29 | Stop reason: HOSPADM

## 2023-08-29 RX ORDER — ONDANSETRON 2 MG/ML
4 INJECTION INTRAMUSCULAR; INTRAVENOUS EVERY 6 HOURS PRN
Status: DISCONTINUED | OUTPATIENT
Start: 2023-08-29 | End: 2023-08-29 | Stop reason: HOSPADM

## 2023-08-29 RX ORDER — ONDANSETRON 4 MG/1
4 TABLET, ORALLY DISINTEGRATING ORAL EVERY 6 HOURS PRN
Status: DISCONTINUED | OUTPATIENT
Start: 2023-08-29 | End: 2023-08-29 | Stop reason: HOSPADM

## 2023-08-29 RX ORDER — NALOXONE HYDROCHLORIDE 0.4 MG/ML
0.4 INJECTION, SOLUTION INTRAMUSCULAR; INTRAVENOUS; SUBCUTANEOUS
Status: DISCONTINUED | OUTPATIENT
Start: 2023-08-29 | End: 2023-08-29 | Stop reason: HOSPADM

## 2023-08-29 RX ORDER — PROCHLORPERAZINE MALEATE 10 MG
10 TABLET ORAL EVERY 6 HOURS PRN
Status: DISCONTINUED | OUTPATIENT
Start: 2023-08-29 | End: 2023-08-29 | Stop reason: HOSPADM

## 2023-08-29 RX ORDER — ONDANSETRON 2 MG/ML
4 INJECTION INTRAMUSCULAR; INTRAVENOUS
Status: DISCONTINUED | OUTPATIENT
Start: 2023-08-29 | End: 2023-08-29 | Stop reason: HOSPADM

## 2023-08-29 RX ORDER — SIMETHICONE 40MG/0.6ML
133 SUSPENSION, DROPS(FINAL DOSAGE FORM)(ML) ORAL
Status: COMPLETED | OUTPATIENT
Start: 2023-08-29 | End: 2023-08-29

## 2023-08-29 RX ADMIN — MIDAZOLAM 2 MG: 1 INJECTION INTRAMUSCULAR; INTRAVENOUS at 12:01

## 2023-08-29 RX ADMIN — SIMETHICONE 30 MG: 20 EMULSION ORAL at 12:11

## 2023-08-29 RX ADMIN — FENTANYL CITRATE 100 MCG: 50 INJECTION, SOLUTION INTRAMUSCULAR; INTRAVENOUS at 12:01

## 2023-08-29 ASSESSMENT — ACTIVITIES OF DAILY LIVING (ADL)
ADLS_ACUITY_SCORE: 35
ADLS_ACUITY_SCORE: 35

## 2023-08-29 NOTE — H&P
Pre-Endoscopy History and Physical     Kiera Manzo MRN# [id number]   YOB: 1965 Age: 58 year old     Date of Procedure: 2023  Primary care provider: System, Provider Not In  Type of Endoscopy: colonoscopy  Reason for Procedure: surveillance  Type of Anesthesia Anticipated: Moderate Sedation    HPI:    Kiera is a 58 year old female who will be undergoing the above procedure.      A history and physical has been performed. The patient's medications and allergies have been reviewed. The risks and benefits of the procedure and the sedation options and risks were discussed with the patient.  All questions were answered and informed consent was obtained.      She denies a personal or family history of anesthesia complications or bleeding disorders.     Allergies   Allergen Reactions    Adhesive Tape Itching    Latex Other (See Comments)     Redness         Prior to Admission Medications   Prescriptions Last Dose Informant Patient Reported? Taking?   KLONOPIN OR   Yes No   Si tab hs   acetaminophen (TYLENOL) 500 MG tablet   No No   Sig: Take 1-2 tablets (500-1,000 mg) by mouth every 6 hours as needed for pain   aspirin 81 MG tablet   No No   Sig: Take 1 tablet (81 mg) by mouth daily   cetirizine (ZYRTEC) 10 MG tablet   No No   Sig: Take 1 tablet (10 mg) by mouth every evening   diphenhydrAMINE (BENADRYL) 25 MG tablet   No No   Sig: Take 1-2 tablets (25-50 mg) by mouth every 6 hours as needed for itching   hydrOXYzine (ATARAX) 25 MG tablet   Yes Yes   Sig: Take 25 mg by mouth At Bedtime   levothyroxine (SYNTHROID, LEVOTHROID) 150 MCG tablet 2023  Yes Yes   Sig: Take 150 mcg by mouth daily.   pseudoePHEDrine (SUDAFED) 120 MG 12 hr tablet   No No   Sig: Take 1 tablet (120 mg) by mouth every 12 hours   sertraline (ZOLOFT) 100 MG tablet   Yes Yes   Sig: Take 1.5 tablets (150 mg) by mouth daily      Facility-Administered Medications: None       Patient Active Problem List   Diagnosis     "Hypothyroidism    Generalized osteoarthrosis, unspecified site    Arthralgia of temporomandibular joint    Anxiety state    Hyperlipidemia LDL goal <160    Family hx-breast malignancy    Elevated glucose    Hypertriglyceridemia        Past Medical History:   Diagnosis Date    Absence of menstruation     using nuvaring    Arthralgia of temporomandibular joint     Depressive disorder, not elsewhere classified     with anxiety, tx with zoloft in past    NONSPECIFIC MEDICAL HISTORY 1999    vag del x1    NONSPECIFIC MEDICAL HISTORY 2004    fertility tx, unsuccessful    Unspecified hypothyroidism     managed by Dr Smith        Past Surgical History:   Procedure Laterality Date    OVARIAN CYST REMOVAL      [id number]       Social History     Tobacco Use    Smoking status: Never    Smokeless tobacco: Never   Substance Use Topics    Alcohol use: Yes     Comment: occasionally-wine once a week       Family History   Problem Relation Age of Onset    Hypertension Mother     Breast Cancer Mother         early stage, remission    Heart Disease Mother     Hypertension Brother     Hypertension Brother     Colon Cancer No family hx of        REVIEW OF SYSTEMS:     5 point ROS negative except as noted above in HPI, including Gen., Resp., CV, GI &  system review.      PHYSICAL EXAM:   Ht 1.676 m (5' 6\")   Wt 74.8 kg (165 lb)   LMP 04/11/2006   BMI 26.63 kg/m   Estimated body mass index is 26.63 kg/m  as calculated from the following:    Height as of this encounter: 1.676 m (5' 6\").    Weight as of this encounter: 74.8 kg (165 lb).   GENERAL APPEARANCE: healthy and alert  MENTAL STATUS: alert  AIRWAY EXAM: Mallampatti Class I (visualization of the soft palate, fauces, uvula, anterior and posterior pillars)  RESP: lungs clear to auscultation - no rales, rhonchi or wheezes  CV: regular rates and rhythm      DIAGNOSTICS:    Not indicated      IMPRESSION   ASA Class 2 - Mild systemic disease        PLAN:       Plan for colonoscopy. We " discussed the risks, benefits and alternatives and the patient wished to proceed.    The above has been forwarded to the consulting provider.      Signed Electronically by: Taylor Palacios MD, MD  August 29, 2023

## 2023-11-16 ENCOUNTER — HOSPITAL ENCOUNTER (OUTPATIENT)
Dept: MAMMOGRAPHY | Facility: CLINIC | Age: 58
Discharge: HOME | End: 2023-11-16
Attending: FAMILY MEDICINE | Admitting: FAMILY MEDICINE
Payer: COMMERCIAL

## 2023-11-16 DIAGNOSIS — Z12.31 VISIT FOR SCREENING MAMMOGRAM: ICD-10-CM

## 2023-11-16 PROCEDURE — 77067 SCR MAMMO BI INCL CAD: CPT

## 2024-01-01 NOTE — DISCHARGE INSTRUCTIONS
The patient has received a copy of the Provation report the doctor has written and discharge instructions have been discussed with the patient and responsible adult.  All questions were addressed and answered prior to patient discharge.      patient

## 2024-08-17 ENCOUNTER — HEALTH MAINTENANCE LETTER (OUTPATIENT)
Age: 59
End: 2024-08-17

## 2025-05-26 SDOH — HEALTH STABILITY: PHYSICAL HEALTH: ON AVERAGE, HOW MANY DAYS PER WEEK DO YOU ENGAGE IN MODERATE TO STRENUOUS EXERCISE (LIKE A BRISK WALK)?: 5 DAYS

## 2025-05-26 SDOH — HEALTH STABILITY: PHYSICAL HEALTH: ON AVERAGE, HOW MANY MINUTES DO YOU ENGAGE IN EXERCISE AT THIS LEVEL?: 20 MIN

## 2025-05-26 ASSESSMENT — SOCIAL DETERMINANTS OF HEALTH (SDOH): HOW OFTEN DO YOU GET TOGETHER WITH FRIENDS OR RELATIVES?: PATIENT DECLINED

## 2025-05-27 ENCOUNTER — OFFICE VISIT (OUTPATIENT)
Dept: FAMILY MEDICINE | Facility: CLINIC | Age: 60
End: 2025-05-27
Payer: COMMERCIAL

## 2025-05-27 ENCOUNTER — ANCILLARY PROCEDURE (OUTPATIENT)
Dept: MAMMOGRAPHY | Facility: CLINIC | Age: 60
End: 2025-05-27
Payer: COMMERCIAL

## 2025-05-27 VITALS
RESPIRATION RATE: 14 BRPM | HEART RATE: 82 BPM | SYSTOLIC BLOOD PRESSURE: 124 MMHG | HEIGHT: 66 IN | DIASTOLIC BLOOD PRESSURE: 91 MMHG | BODY MASS INDEX: 26.63 KG/M2 | OXYGEN SATURATION: 98 % | TEMPERATURE: 98.3 F

## 2025-05-27 DIAGNOSIS — E06.3 HYPOTHYROIDISM DUE TO HASHIMOTO THYROIDITIS: ICD-10-CM

## 2025-05-27 DIAGNOSIS — Z13.1 SCREENING FOR DIABETES MELLITUS: ICD-10-CM

## 2025-05-27 DIAGNOSIS — E78.1 HYPERTRIGLYCERIDEMIA: ICD-10-CM

## 2025-05-27 DIAGNOSIS — Z12.31 VISIT FOR SCREENING MAMMOGRAM: ICD-10-CM

## 2025-05-27 DIAGNOSIS — E78.5 HYPERLIPIDEMIA LDL GOAL <160: ICD-10-CM

## 2025-05-27 DIAGNOSIS — Z11.59 NEED FOR HEPATITIS C SCREENING TEST: ICD-10-CM

## 2025-05-27 DIAGNOSIS — Z11.4 SCREENING FOR HIV (HUMAN IMMUNODEFICIENCY VIRUS): ICD-10-CM

## 2025-05-27 DIAGNOSIS — E55.9 VITAMIN D DEFICIENCY: ICD-10-CM

## 2025-05-27 DIAGNOSIS — Z00.00 ROUTINE GENERAL MEDICAL EXAMINATION AT A HEALTH CARE FACILITY: Primary | ICD-10-CM

## 2025-05-27 DIAGNOSIS — Z12.4 CERVICAL CANCER SCREENING: ICD-10-CM

## 2025-05-27 PROBLEM — Z86.59 HISTORY OF EATING DISORDER: Status: ACTIVE | Noted: 2025-05-27

## 2025-05-27 LAB
ERYTHROCYTE [DISTWIDTH] IN BLOOD BY AUTOMATED COUNT: 11.9 % (ref 10–15)
EST. AVERAGE GLUCOSE BLD GHB EST-MCNC: 114 MG/DL
HBA1C MFR BLD: 5.6 % (ref 0–5.6)
HCT VFR BLD AUTO: 45 % (ref 35–47)
HGB BLD-MCNC: 15.2 G/DL (ref 11.7–15.7)
MCH RBC QN AUTO: 30.8 PG (ref 26.5–33)
MCHC RBC AUTO-ENTMCNC: 33.8 G/DL (ref 31.5–36.5)
MCV RBC AUTO: 91 FL (ref 78–100)
PLATELET # BLD AUTO: 351 10E3/UL (ref 150–450)
RBC # BLD AUTO: 4.94 10E6/UL (ref 3.8–5.2)
WBC # BLD AUTO: 7.5 10E3/UL (ref 4–11)

## 2025-05-27 PROCEDURE — 3044F HG A1C LEVEL LT 7.0%: CPT

## 2025-05-27 PROCEDURE — 90471 IMMUNIZATION ADMIN: CPT

## 2025-05-27 PROCEDURE — 36415 COLL VENOUS BLD VENIPUNCTURE: CPT

## 2025-05-27 PROCEDURE — 85027 COMPLETE CBC AUTOMATED: CPT

## 2025-05-27 PROCEDURE — 99214 OFFICE O/P EST MOD 30 MIN: CPT | Mod: 25

## 2025-05-27 PROCEDURE — 83036 HEMOGLOBIN GLYCOSYLATED A1C: CPT

## 2025-05-27 PROCEDURE — 99386 PREV VISIT NEW AGE 40-64: CPT | Mod: 25

## 2025-05-27 PROCEDURE — 87624 HPV HI-RISK TYP POOLED RSLT: CPT

## 2025-05-27 PROCEDURE — 90750 HZV VACC RECOMBINANT IM: CPT

## 2025-05-27 PROCEDURE — 77067 SCR MAMMO BI INCL CAD: CPT | Mod: TC | Performed by: RADIOLOGY

## 2025-05-27 PROCEDURE — 80053 COMPREHEN METABOLIC PANEL: CPT

## 2025-05-27 PROCEDURE — 87389 HIV-1 AG W/HIV-1&-2 AB AG IA: CPT

## 2025-05-27 PROCEDURE — 80061 LIPID PANEL: CPT

## 2025-05-27 PROCEDURE — 77063 BREAST TOMOSYNTHESIS BI: CPT | Mod: TC | Performed by: RADIOLOGY

## 2025-05-27 PROCEDURE — 3080F DIAST BP >= 90 MM HG: CPT

## 2025-05-27 PROCEDURE — 86803 HEPATITIS C AB TEST: CPT

## 2025-05-27 PROCEDURE — 82306 VITAMIN D 25 HYDROXY: CPT

## 2025-05-27 PROCEDURE — 84443 ASSAY THYROID STIM HORMONE: CPT

## 2025-05-27 PROCEDURE — 99459 PELVIC EXAMINATION: CPT

## 2025-05-27 PROCEDURE — 3074F SYST BP LT 130 MM HG: CPT

## 2025-05-27 RX ORDER — LIOTHYRONINE SODIUM 5 UG/1
1 TABLET ORAL
COMMUNITY
Start: 2025-05-15

## 2025-05-27 NOTE — PROGRESS NOTES
Preventive Care Visit  [facility]  Victorina ShahlaashwinLEESA CNP, Family Medicine  May 27, 2025      Assessment & Plan     (Z00.00) Routine general medical examination at a health care facility  (primary encounter diagnosis)  Comment: Reviewed health maintenance/screening services guidelines/recommendations as well as vaccination recommendations as indicated which Kiera understands. Services ordered after shared decision making agreed upon. Recommended healthy habits/diet and exercise.    Plan: Comprehensive metabolic panel, Hemoglobin A1c,         Lipid panel reflex to direct LDL Fasting, TSH         with free T4 reflex, CBC with platelets    (E06.3) Hypothyroidism due to Hashimoto thyroiditis  Comment: Chronic medical condition managed by endo. Currently taking Synthroid 150 mcg daily and liothyronine 5 mcg daily.   Plan: Comprehensive metabolic panel, TSH with free T4        reflex, CBC with platelets    (E78.5) Hyperlipidemia LDL goal <160  (E78.1) Hypertriglyceridemia  Comment: Chronic medical condition due for monitoring. Not currently requiring medication. Recommended continuing to work on therapeutic lifestyle changes including a heart healthy diet, regular physical activity and weight management/loss efforts.    Plan: Comprehensive metabolic panel, Lipid panel         reflex to direct LDL Fasting    (Z12.4) Cervical cancer screening  Plan: HPV and Gynecologic Cytology Panel -         Recommended Age 30 - 65 Years, MD PELVIC         EXAMINATION    (Z13.1) Screening for diabetes mellitus  Plan: A1c and CMP today    (Z11.4) Screening for HIV (human immunodeficiency virus)  Plan: HIV Antigen Antibody Combo    (Z11.59) Need for hepatitis C screening test  Plan: Hepatitis C Screen Reflex to HCV RNA Quant and         Genotype    (Z12.31) Visit for screening mammogram  Plan: MA Screen Bilateral w/Arian    (E55.9) Vitamin D deficiency  Plan: Vitamin D Deficiency         Counseling  Appropriate  preventive services were addressed with this patient via screening, questionnaire, or discussion as appropriate for fall prevention, nutrition, physical activity, Tobacco-use cessation, social engagement, weight loss and cognition.  Checklist reviewing preventive services available has been given to the patient.  Reviewed patient's diet, addressing concerns and/or questions.         Don Qureshi is a 59 year old, presenting for the following:  Physical        5/27/2025     9:44 AM   Additional Questions   Roomed by Paris Rodríguez         5/27/2025   Declines Weight   Did patient decline having their weight taken? Yes          HPI  Kiera Manzo is a 59-year-old female here for her annual exam. She has a history of hypothyroidism, which is managed by an endocrinologist. She also has a history of elevated cholesterol levels and high blood pressure, although her blood pressure was better today than it has been in the past. She does not currently take any medications for high cholesterol or high blood pressure but is open to medication if necessary. She has been working on lifestyle changes to manage her weight and cholesterol levels, including seeing a  twice a week and consulting with a nutritionist. She has a history of an eating disorder, which is not currently active. She also has a history of anxiety for which she sees a psychiatrist and takes sertraline 150 mg. For sleep, she takes hydroxyzine at night and used to take Klonopin. She no longer takes baby aspirin and uses Zyrtec occasionally and Sudafed as needed. Kiera is interested in receiving the shingles vaccine but is concerned about its longevity and potential side effects. She is due for a tetanus vaccine but prefers not to receive it on the same day as the shingles vaccine. She had menopause before age 40 and there was some consideration that her thyroid condition might be Hashimoto's disease. There have been no recent surgeries  since her hospitalization for a cyst removal and childbirth.          5/26/2025   General Health   How would you rate your overall physical health? Good   Feel stress (tense, anxious, or unable to sleep) Only a little   (!) STRESS CONCERN      5/26/2025   Nutrition   Three or more servings of calcium each day? Yes   Diet: Regular (no restrictions)   How many servings of fruit and vegetables per day? (!) 0-1   How many sweetened beverages each day? 0-1         5/26/2025   Exercise   Days per week of moderate/strenous exercise 5 days   Average minutes spent exercising at this level 20 min         5/26/2025   Social Factors   Frequency of gathering with friends or relatives Patient declined   Worry food won't last until get money to buy more No   Food not last or not have enough money for food? No   Do you have housing? (Housing is defined as stable permanent housing and does not include staying outside in a car, in a tent, in an abandoned building, in an overnight shelter, or couch-surfing.) Yes   Are you worried about losing your housing? No   Lack of transportation? No   Unable to get utilities (heat,electricity)? No         5/26/2025   Fall Risk   Fallen 2 or more times in the past year? No   Trouble with walking or balance? No          5/26/2025   Dental   Dentist two times every year? Yes         Today's PHQ-2 Score:       5/26/2025    12:30 PM   PHQ-2 ( 1999 Pf[employer])   Q1: Little interest or pleasure in doing things 0   Q2: Feeling down, depressed or hopeless 0   PHQ-2 Score 0    Q1: Little interest or pleasure in doing things Not at all   Q2: Feeling down, depressed or hopeless Not at all   PHQ-2 Score 0       Patient-reported           5/26/2025   Substance Use   Alcohol more than 3/day or more than 7/wk No   Do you use any other substances recreationally? No     Social History     Tobacco Use    Smoking status: Never    Smokeless tobacco: Never   Vaping Use    Vaping status: Never Used   Substance Use Topics  "   Alcohol use: Yes     Comment: Occasional weekend or social use    Drug use: No           11/16/2023   LAST FHS-7 RESULTS   1st degree relative breast or ovarian cancer No   Any relative bilateral breast cancer No   Any male have breast cancer No   Any ONE woman have BOTH breast AND ovarian cancer No   Any woman with breast cancer before 50yrs No   2 or more relatives with breast AND/OR ovarian cancer No   2 or more relatives with breast AND/OR bowel cancer No        Mammogram Screening - Mammogram every 1-2 years updated in Health Maintenance based on mutual decision making          5/26/2025   One time HIV Screening   Previous HIV test? I don't know         5/26/2025   STI Screening   New sexual partner(s) since last STI/HIV test? No     History of abnormal Pap smear: No - age 30-64 HPV with reflex Pap every 5 years recommended        6/2/2014     9:01 AM 9/14/2012     8:40 AM   PAP / HPV   PAP (Historical) NIL  NIL      ASCVD Risk   The ASCVD Risk score (Juan Miguel GRAVES, et al., 2019) failed to calculate for the following reasons:    Cannot find a previous HDL lab    Cannot find a previous total cholesterol lab           Reviewed and updated as needed this visit by Provider   Tobacco  Allergies  Meds  Problems  Med Hx  Surg Hx  Fam Hx                 Objective    Exam  BP (!) 124/91 (BP Location: Right arm, Patient Position: Sitting, Cuff Size: Adult Regular)   Pulse 82   Temp 98.3  F (36.8  C) (Oral)   Resp 14   Ht 1.676 m (5' 6\")   LMP 04/11/2006   SpO2 98%   BMI 26.63 kg/m     Estimated body mass index is 26.63 kg/m  as calculated from the following:    Height as of this encounter: 1.676 m (5' 6\").    Weight as of 8/29/23: 74.8 kg (165 lb).    Physical Exam  GENERAL: alert and no distress  EYES: Eyes grossly normal to inspection, PERRL and conjunctivae and sclerae normal  HENT: ear canals and TM's normal, nose and mouth without ulcers or lesions  NECK: no adenopathy, no asymmetry, masses, or " scars  RESP: lungs clear to auscultation - no rales, rhonchi or wheezes  BREAST: normal without masses, tenderness or nipple discharge and no palpable axillary masses or adenopathy  CV: regular rate and rhythm, normal S1 S2, no S3 or S4, no murmur, click or rub, no peripheral edema  ABDOMEN: soft, nontender, no hepatosplenomegaly, no masses and bowel sounds normal   (female) w/bimanual: normal female external genitalia, normal urethral meatus, normal vaginal mucosa, and normal cervix/adnexa/uterus without masses or discharge  MS: no gross musculoskeletal defects noted, no edema  NEURO: Normal strength and tone, mentation intact and speech normal  PSYCH: mentation appears normal, affect normal/bright        Signed Electronically by: LEESA Gutierrez CNP

## 2025-05-27 NOTE — PATIENT INSTRUCTIONS
<140/90 - If consistently above these numbers, please come back and see me.     Calcium 1200 mg daily in divided doses and at least 1000 units of Vitamin D3 daily.   Patient Education   Preventive Care Advice   This is general advice given by our system to help you stay healthy. However, your care team may have specific advice just for you. Please talk to your care team about your preventive care needs.  Nutrition  Eat 5 or more servings of fruits and vegetables each day.  Try wheat bread, brown rice and whole grain pasta (instead of white bread, rice, and pasta).  Get enough calcium and vitamin D. Check the label on foods and aim for 100% of the RDA (recommended daily allowance).  Lifestyle  Exercise at least 150 minutes each week  (30 minutes a day, 5 days a week).  Do muscle strengthening activities 2 days a week. These help control your weight and prevent disease.  No smoking.  Wear sunscreen to prevent skin cancer.  Have a dental exam and cleaning every 6 months.  Yearly exams  See your health care team every year to talk about:  Any changes in your health.  Any medicines your care team has prescribed.  Preventive care, family planning, and ways to prevent chronic diseases.  Shots (vaccines)   HPV shots (up to age 26), if you've never had them before.  Hepatitis B shots (up to age 59), if you've never had them before.  COVID-19 shot: Get this shot when it's due.  Flu shot: Get a flu shot every year.  Tetanus shot: Get a tetanus shot every 10 years.  Pneumococcal, hepatitis A, and RSV shots: Ask your care team if you need these based on your risk.  Shingles shot (for age 50 and up)  General health tests  Diabetes screening:  Starting at age 35, Get screened for diabetes at least every 3 years.  If you are younger than age 35, ask your care team if you should be screened for diabetes.  Cholesterol test: At age 39, start having a cholesterol test every 5 years, or more often if advised.  Bone density scan (DEXA):  At age 50, ask your care team if you should have this scan for osteoporosis (brittle bones).  Hepatitis C: Get tested at least once in your life.  STIs (sexually transmitted infections)  Before age 24: Ask your care team if you should be screened for STIs.  After age 24: Get screened for STIs if you're at risk. You are at risk for STIs (including HIV) if:  You are sexually active with more than one person.  You don't use condoms every time.  You or a partner was diagnosed with a sexually transmitted infection.  If you are at risk for HIV, ask about PrEP medicine to prevent HIV.  Get tested for HIV at least once in your life, whether you are at risk for HIV or not.  Cancer screening tests  Cervical cancer screening: If you have a cervix, begin getting regular cervical cancer screening tests starting at age 21.  Breast cancer scan (mammogram): If you've ever had breasts, begin having regular mammograms starting at age 40. This is a scan to check for breast cancer.  Colon cancer screening: It is important to start screening for colon cancer at age 45.  Have a colonoscopy test every 10 years (or more often if you're at risk) Or, ask your provider about stool tests like a FIT test every year or Cologuard test every 3 years.  To learn more about your testing options, visit:   .  For help making a decision, visit:   https://bit.ly/wq69671.  Prostate cancer screening test: If you have a prostate, ask your care team if a prostate cancer screening test (PSA) at age 55 is right for you.  Lung cancer screening: If you are a current or former smoker ages 50 to 80, ask your care team if ongoing lung cancer screenings are right for you.  For informational purposes only. Not to replace the advice of your health care provider. Copyright   2023 "[employer]. All rights reserved. Clinically reviewed by the [facility]itions Program. [employer] [id number] - REV 01/24.

## 2025-05-28 ENCOUNTER — RESULTS FOLLOW-UP (OUTPATIENT)
Dept: OBGYN | Facility: CLINIC | Age: 60
End: 2025-05-28

## 2025-05-28 LAB
ALBUMIN SERPL BCG-MCNC: 4.4 G/DL (ref 3.5–5.2)
ALP SERPL-CCNC: 64 U/L (ref 40–150)
ALT SERPL W P-5'-P-CCNC: 19 U/L (ref 0–50)
ANION GAP SERPL CALCULATED.3IONS-SCNC: 11 MMOL/L (ref 7–15)
AST SERPL W P-5'-P-CCNC: 22 U/L (ref 0–45)
BILIRUB SERPL-MCNC: 0.4 MG/DL
BUN SERPL-MCNC: 13.7 MG/DL (ref 6–20)
CALCIUM SERPL-MCNC: 9.9 MG/DL (ref 8.8–10.4)
CHLORIDE SERPL-SCNC: 103 MMOL/L (ref 98–107)
CHOLEST SERPL-MCNC: 315 MG/DL
CREAT SERPL-MCNC: 0.7 MG/DL (ref 0.51–0.95)
EGFRCR SERPLBLD CKD-EPI 2021: >90 ML/MIN/1.73M2
FASTING STATUS PATIENT QL REPORTED: YES
FASTING STATUS PATIENT QL REPORTED: YES
GLUCOSE SERPL-MCNC: 101 MG/DL (ref 70–99)
HCO3 SERPL-SCNC: 26 MMOL/L (ref 22–29)
HCV AB SERPL QL IA: NONREACTIVE
HDLC SERPL-MCNC: 43 MG/DL
HIV 1+2 AB+HIV1 P24 AG SERPL QL IA: NONREACTIVE
HPV HR 12 DNA CVX QL NAA+PROBE: NEGATIVE
HPV16 DNA CVX QL NAA+PROBE: NEGATIVE
HPV18 DNA CVX QL NAA+PROBE: NEGATIVE
HUMAN PAPILLOMA VIRUS FINAL DIAGNOSIS: NORMAL
LDLC SERPL CALC-MCNC: 209 MG/DL
NONHDLC SERPL-MCNC: 272 MG/DL
POTASSIUM SERPL-SCNC: 4.4 MMOL/L (ref 3.4–5.3)
PROT SERPL-MCNC: 7.2 G/DL (ref 6.4–8.3)
SODIUM SERPL-SCNC: 140 MMOL/L (ref 135–145)
TRIGL SERPL-MCNC: 315 MG/DL
TSH SERPL DL<=0.005 MIU/L-ACNC: 2.33 UIU/ML (ref 0.3–4.2)
VIT D+METAB SERPL-MCNC: 57 NG/ML (ref 20–50)

## 2025-06-09 ENCOUNTER — MYC MEDICAL ADVICE (OUTPATIENT)
Dept: FAMILY MEDICINE | Facility: CLINIC | Age: 60
End: 2025-06-09

## 2025-06-09 ENCOUNTER — VIRTUAL VISIT (OUTPATIENT)
Dept: FAMILY MEDICINE | Facility: CLINIC | Age: 60
End: 2025-06-09
Payer: COMMERCIAL

## 2025-06-09 DIAGNOSIS — E67.3 HYPERVITAMINOSIS D: ICD-10-CM

## 2025-06-09 DIAGNOSIS — E78.5 HYPERLIPIDEMIA LDL GOAL <160: ICD-10-CM

## 2025-06-09 DIAGNOSIS — Z86.59 HISTORY OF EATING DISORDER: Primary | ICD-10-CM

## 2025-06-09 DIAGNOSIS — E78.1 HYPERTRIGLYCERIDEMIA: ICD-10-CM

## 2025-06-09 PROCEDURE — 98014 SYNCH AUDIO-ONLY EST MOD 30: CPT

## 2025-06-09 RX ORDER — ICOSAPENT ETHYL 1 G/1
2 CAPSULE ORAL 2 TIMES DAILY WITH MEALS
Qty: 360 CAPSULE | Refills: 3 | Status: SHIPPED | OUTPATIENT
Start: 2025-06-09

## 2025-06-09 NOTE — PROGRESS NOTES
Kiera is a 59 year old who is being evaluated via a billable telephone visit.    What phone number would you like to be contacted at? [phone]  How would you like to obtain your AVS? Aric  Originating Location (pt. Location): Home    Distant Location (provider location):  On-site  Telephone visit completed due to the patient did not have access to video, while the distant provider did.    Assessment & Plan     History of eating disorder  - History of bulimia during teenage years and binge eating disorder during the pandemic. Concerns about triggering eating disorder with ultra-restrictive dieting.  - Aim for a balanced diet with 9052-6107 calories daily. Avoid ultra-restrictive diets. Focus on lifestyle changes including regular exercise and healthy eating habits.    Hyperlipidemia LDL goal <160  - Likely familial hyperlipidemia. Previous success in lowering cholesterol with lifestyle changes. Concerns about starting statins due to family history of side effects.  - Lifestyle modifications including exercise and dietary changes. Consider CT calcium score to assess coronary artery calcification. Prescription of Vascepa (Omega-3 fatty acid) to help manage triglycerides. Follow-up lipid panel scheduled for August 8, 2025.    Hypertriglyceridemia  - Elevated triglycerides possibly due to dietary habits during the pandemic.  - Prescription of Vascepa (Omega-3 fatty acid) to help manage triglycerides. Lifestyle modifications including exercise and dietary changes. Follow-up lipid panel scheduled for August 8, 2025.    Hypervitaminosis D  - Elevated vitamin D levels, possibly due to supplementation.  - Continue multivitamin and reduce vitamin D supplement intake to every other day or every two days. Recheck vitamin D levels in August 2025.      The longitudinal plan of care for the diagnosis(es)/condition(s) as documented were addressed during this visit. Due to the added complexity in care, I will continue to  support Kiera in the subsequent management and with ongoing continuity of care.    Subjective   Kiera is a 59 year old, presenting for the following health issues:  Lipids and Hypertension        6/9/2025     1:07 PM   Additional Questions   Roomed by Paris Rodríguez     History of Present Illness       Hyperlipidemia:  She presents for follow up of hyperlipidemia.   She is not taking medication to lower cholesterol. She is not having myalgia or other side effects to statin medications.    Hypertension: She presents for follow up of hypertension.  She does not check blood pressure  regularly outside of the clinic. Outpatient blood pressures have not been over 140/90. She does not follow a low salt diet.     She eats 0-1 servings of fruits and vegetables daily.She consumes 0 sweetened beverage(s) daily.She exercises with enough effort to increase her heart rate 10 to 19 minutes per day.  She exercises with enough effort to increase her heart rate 3 or less days per week.   She is taking medications regularly.        History of Present Illness-  Kiera Manzo, a 59-year-old female, has a history of eating disorders, including bulimia during her teenage years and binge eating disorder during the pandemic. She mentioned engaging in severely restrictive diets followed by binge eating, which she believes may have affected her metabolism. She is cautious about triggering her eating disorder again and plans to manage her diet by counting calories between 1300 and 1600 daily.    Kiera expressed concerns about her cholesterol and triglycerides levels, which she found shocking. She has a family history of high cholesterol, noting her mother's adverse experience with Lipitor. She has been taking fish oil supplements, believing they would help with triglycerides and cholesterol, but was surprised by the lack of improvement. She also mentioned her vitamin D levels were unexpectedly high despite limited sun exposure and taking  "supplements irregularly.    During the pandemic, Kiera adopted poor eating habits, consuming processed foods and leading a sedentary lifestyle, which she attributes to her current health concerns. She previously worked with a  and nutritionist, achieving better health metrics before the pandemic. She recalled a CT calcium score from 2012, which showed almost no calcifications at that time.   Objective    Vitals - Patient Reported  Weight (Patient Reported): 80.7 kg (178 lb)  Height (Patient Reported): 167.6 cm (5' 6\")  BMI (Based on Pt Reported Ht/Wt): 28.73      Vitals:  No vitals were obtained today due to virtual visit.    Physical Exam   General: Alert and no distress //Respiratory: No audible wheeze, cough, or shortness of breath // Psychiatric:  Appropriate affect, tone, and pace of words          Phone call duration: 29 minutes  Signed Electronically by: LEESA Gutierrez CNP    "

## 2025-06-28 ENCOUNTER — HOSPITAL ENCOUNTER (OUTPATIENT)
Dept: CT IMAGING | Facility: CLINIC | Age: 60
Discharge: HOME | End: 2025-06-28
Payer: COMMERCIAL

## 2025-06-28 DIAGNOSIS — E78.5 HYPERLIPIDEMIA LDL GOAL <160: ICD-10-CM

## 2025-06-28 PROCEDURE — 75571 CT HRT W/O DYE W/CA TEST: CPT

## 2025-06-28 PROCEDURE — 75571 CT HRT W/O DYE W/CA TEST: CPT | Mod: 26 | Performed by: INTERNAL MEDICINE

## 2025-07-01 ENCOUNTER — RESULTS FOLLOW-UP (OUTPATIENT)
Dept: FAMILY MEDICINE | Facility: CLINIC | Age: 60
End: 2025-07-01

## 2025-07-23 ENCOUNTER — DOCUMENTATION ONLY (OUTPATIENT)
Dept: FAMILY MEDICINE | Facility: CLINIC | Age: 60
End: 2025-07-23
Payer: COMMERCIAL

## 2025-07-23 DIAGNOSIS — E78.5 HYPERLIPIDEMIA LDL GOAL <160: Primary | ICD-10-CM

## 2025-07-23 NOTE — PROGRESS NOTES
Kiera Manzo has an upcoming lab appointment:    Future Appointments   Date Time Provider Department Center   8/8/2025 10:15 AM CR LAB CRLABR CR     Patient is requesting the following lab(s): lipid/cholesterol recheck There is no order available for this. Please review and place either future orders or HMPO (Review of Health Maintenance Protocol Orders), as appropriate.      Thank You,    Perla BOLAÑOS III  [facility]  P: [phone]

## (undated) DEVICE — KIT ENDO TURNOVER/PROCEDURE W/CLEAN A SCOPE LINERS 103888

## (undated) DEVICE — CLIP HEMOCLIP ENDOSCOPIC INSTINCT 2.8X230CM INSC-7-230-SS

## (undated) RX ORDER — FENTANYL CITRATE 50 UG/ML
INJECTION, SOLUTION INTRAMUSCULAR; INTRAVENOUS
Status: DISPENSED
Start: 2017-08-14

## (undated) RX ORDER — FENTANYL CITRATE 50 UG/ML
INJECTION, SOLUTION INTRAMUSCULAR; INTRAVENOUS
Status: DISPENSED
Start: 2023-08-29

## (undated) RX ORDER — SIMETHICONE 40MG/0.6ML
SUSPENSION, DROPS(FINAL DOSAGE FORM)(ML) ORAL
Status: DISPENSED
Start: 2023-08-29